# Patient Record
Sex: FEMALE | Race: OTHER | Employment: UNEMPLOYED | URBAN - METROPOLITAN AREA
[De-identification: names, ages, dates, MRNs, and addresses within clinical notes are randomized per-mention and may not be internally consistent; named-entity substitution may affect disease eponyms.]

---

## 2018-03-20 ENCOUNTER — OFFICE VISIT (OUTPATIENT)
Dept: GYNECOLOGY | Age: 63
End: 2018-03-20

## 2018-03-20 VITALS
HEIGHT: 58 IN | HEART RATE: 80 BPM | BODY MASS INDEX: 38.08 KG/M2 | WEIGHT: 181.4 LBS | DIASTOLIC BLOOD PRESSURE: 88 MMHG | SYSTOLIC BLOOD PRESSURE: 140 MMHG

## 2018-03-20 DIAGNOSIS — C54.1 ENDOMETRIAL CANCER (HCC): Primary | ICD-10-CM

## 2018-03-20 DIAGNOSIS — R10.84 GENERALIZED ABDOMINAL PAIN: ICD-10-CM

## 2018-03-20 NOTE — PROGRESS NOTES
Formerly [facility], [facility] GYNECOLOGIC ONCOLOGY  [address] [place] [address], [address], [address] [facility]  [phone]  MD Kj Ramos MD Jeffrie Martin, [address] [facility] [place]  Office Visit    Patient ID:  Name: Cathie Cisneros  MRN: [id number]  : 1955/62 y.o. Visit date: 3/20/2018    Primary Diagnosis:     ICD-10-CM ICD-9-CM    1. Endometrial cancer (HCC) C54.1 182.0 CT ABD PELV W CONT   2. Generalized abdominal pain R10.84 789.07 CT ABD PELV W CONT        Problem List:    Patient Active Problem List   Diagnosis Code    [facility] C54.1       INTERVAL HISTORY: Cathie Cisneros is a  female with a history of stage II endometrial cancer. She underwent TLH, BSO, and staging in 2014. Postoperatively she received pelvic radiation therapy. She was last seen in the office in 2015. She presents today for a problem visit complaining of upper abdominal pain. She denies any vaginal bleeding or discharge. She denies any nausea/vomiting and reports normal bowel movements. Negative  and GI review. Negative cardiopulmonary review. No SOB, palpitations. No syncope  Negative musculoskeletal review. Negative Psychological/Neurological review. Patient denies any abnormal bleeding or vaginal discharge. Weight stable. PMH:  Past Medical History:   Diagnosis Date    GERD (gastroesophageal reflux disease)     Hypertension     PMB (postmenopausal bleeding)     Citizen of [place] speaking patient      PSH:  Past Surgical History:   Procedure Laterality Date    HX CHOLECYSTECTOMY      HX TUBAL LIGATION       SOC:  Social History     Social History    Marital status: SINGLE     Spouse name: N/A    Number of children: N/A    Years of education: N/A     Occupational History    Not on file.      Social History Main Topics    Smoking status: Never Smoker    Smokeless tobacco: Never Used    Alcohol use No    Drug use: No    Sexual activity: Not on file     Other Topics Concern    Not on file     Social History Narrative     Family History  Family History   Problem Relation Age of Onset    Anesth Problems Neg Hx      Medications: (reviewed)  Current Outpatient Prescriptions on File Prior to Visit   Medication Sig Dispense Refill    lisinopril (PRINIVIL, ZESTRIL) 20 mg tablet Take  by mouth daily.  citalopram (CELEXA) 20 mg tablet Take  by mouth daily.  traZODone (DESYREL) 50 mg tablet Take  by mouth nightly.  venlafaxine (EFFEXOR) 75 mg tablet Take 25 mg by mouth three (3) times daily.  oxyCODONE-acetaminophen (PERCOCET) 5-325 mg per tablet Take 1 Tab by mouth every four (4) hours as needed. 40 Tab 0    VITAMIN E ACETATE (VITAMIN E PO) Take  by mouth daily. No current facility-administered medications on file prior to visit. Allergies: (reviewed)  No Known Allergies    ROS:  Negative except that mentioned above    OBJECTIVE:    PHYSICAL EXAM  VITAL SIGNS: Vitals:    03/20/18 1551   BP: 140/88   Pulse: 80   Weight: 181 lb 6.4 oz (82.3 kg)   Height: 4' 9.99\" (1.473 m)     Body mass index is 37.92 kg/(m^2). GENERAL SARAH: Conversant, alert, oriented. NAD   HEENT: Normocephalic. Oropharynx clear. Neck: Trachea midline, no JVD, no thyroid enlargement   RESPIRATORY: Lung fields clear to auscultation and percussion. Adequate respiratory effort   CARDIOVASC: RRR without murmur   GASTROINT: soft, non-tender, without masses or organomegaly. No hernia noted   MUSCULOSKEL: FROM. No focal tenderness. EXTREMITIES: extremities normal, atraumatic, no cyanosis or edema. Pulses appreciated. PELVIC: External genitalia: normal general appearance  Urinary system: urethral meatus normal  Vaginal: normal without tenderness, induration or masses  Cervix: absent  Adnexa: removed surgically  Uterus: absent   RECTAL: Deferred   HUONG SURVEY: Negative throughout---neck, axillae, inguina. NEURO: Grossly intact, no acute deficit. Oriented. Not depressed. Not agitated. IMPRESSION AND PLAN:  Sheela Noyola has a diagnosis of stage II endometrial cancer, treated with surgery and adjuvant radiation therapy. She has a normal pelvic examination today. A pap smear was performed. I am going to send her for a CT of the abdomen/pelvis to rule out any abnormalities and try to determine a cause for her pain.           Timo Wilder MD  3/20/2018/9:14 AM

## 2018-03-20 NOTE — PATIENT INSTRUCTIONS
[employer] Activation    Thank you for requesting access to [employer]. Please follow the instructions below to securely access and download your online medical record. [employer] allows you to send messages to your doctor, view your test results, renew your prescriptions, schedule appointments, and more. How Do I Sign Up? 1. In your internet browser, go to [email or web address]. 2. Click on the First Time User? Click Here link in the Sign In box. You will see the New Member Sign Up page. 3. Enter your [employer] Access Code exactly as it appears below. You will not need to use this code after youve completed the sign-up process. If you do not sign up before the expiration date, you must request a new code. [employer] Access Code: [email or web address]  Expires: 2018  3:31 PM (This is the date your [employer] access code will )    4. Enter the last four digits of your Social Security Number (xxxx) and Date of Birth (mm/dd/yyyy) as indicated and click Submit. You will be taken to the next sign-up page. 5. Create a [employer] ID. This will be your [employer] login ID and cannot be changed, so think of one that is secure and easy to remember. 6. Create a [employer] password. You can change your password at any time. 7. Enter your Password Reset Question and Answer. This can be used at a later time if you forget your password. 8. Enter your e-mail address. You will receive e-mail notification when new information is available in [address]. 9. Click Sign Up. You can now view and download portions of your medical record. 10. Click the Download Summary menu link to download a portable copy of your medical information. Additional Information    If you have questions, please visit the Frequently Asked Questions section of the [employer] website at [email or web address] Remember, [employer] is NOT to be used for urgent needs. For medical emergencies, dial 911.

## 2018-03-20 NOTE — PROGRESS NOTES
Pt c/o pain in the upper and lower abdomen for about 2 months. Pt denies changes in bowels or vaginal bleeding. The patient is taking a blood pressure medication but she is not able to confirm if it is the one listed.

## 2018-03-27 ENCOUNTER — HOSPITAL ENCOUNTER (OUTPATIENT)
Dept: LAB | Age: 63
Discharge: HOME | End: 2018-03-27
Payer: SELF-PAY

## 2018-03-27 DIAGNOSIS — C54.1 ENDOMETRIAL CANCER (HCC): Primary | ICD-10-CM

## 2018-03-27 PROCEDURE — 88142 CYTOPATH C/V THIN LAYER: CPT | Performed by: OBSTETRICS & GYNECOLOGY

## 2018-03-28 ENCOUNTER — HOSPITAL ENCOUNTER (OUTPATIENT)
Dept: CT IMAGING | Age: 63
Discharge: HOME | End: 2018-03-28
Attending: OBSTETRICS & GYNECOLOGY
Payer: SELF-PAY

## 2018-03-28 DIAGNOSIS — C54.1 ENDOMETRIAL CANCER (HCC): ICD-10-CM

## 2018-03-28 DIAGNOSIS — R10.84 GENERALIZED ABDOMINAL PAIN: ICD-10-CM

## 2018-03-28 PROCEDURE — 74177 CT ABD & PELVIS W/CONTRAST: CPT

## 2018-03-28 PROCEDURE — 74011000258 HC RX REV CODE- 258: Performed by: OBSTETRICS & GYNECOLOGY

## 2018-03-28 PROCEDURE — 74011636320 HC RX REV CODE- 636/320: Performed by: OBSTETRICS & GYNECOLOGY

## 2018-03-28 RX ORDER — SODIUM CHLORIDE 0.9 % (FLUSH) 0.9 %
10 SYRINGE (ML) INJECTION
Status: COMPLETED | OUTPATIENT
Start: 2018-03-28 | End: 2018-03-28

## 2018-03-28 RX ADMIN — SODIUM CHLORIDE 100 ML: 900 INJECTION, SOLUTION INTRAVENOUS at 10:06

## 2018-03-28 RX ADMIN — Medication 10 ML: at 10:06

## 2018-03-28 RX ADMIN — IOPAMIDOL 100 ML: 755 INJECTION, SOLUTION INTRAVENOUS at 10:06

## 2018-04-04 ENCOUNTER — TELEPHONE (OUTPATIENT)
Dept: GYNECOLOGY | Age: 63
End: 2018-04-04

## 2018-04-04 NOTE — TELEPHONE ENCOUNTER
Attempted to contact the pt and both numbers listed to advise of pap smear results and need for colposcopy at her appointment tomorrow. I was unable to reach or leave a message to call me.

## 2018-04-04 NOTE — PROGRESS NOTES
Attempted to contact the pt but was unable to reach at both numbers listed or leave a message. Will notify pt at appointment on 4/5/18.

## 2018-04-05 ENCOUNTER — OFFICE VISIT (OUTPATIENT)
Dept: GYNECOLOGY | Age: 63
End: 2018-04-05

## 2018-04-05 VITALS
HEIGHT: 58 IN | BODY MASS INDEX: 38.2 KG/M2 | WEIGHT: 182 LBS | HEART RATE: 66 BPM | DIASTOLIC BLOOD PRESSURE: 67 MMHG | SYSTOLIC BLOOD PRESSURE: 127 MMHG

## 2018-04-05 DIAGNOSIS — R87.611 ATYPICAL SQUAMOUS CELLS CANNOT EXCLUDE HIGH GRADE SQUAMOUS INTRAEPITHELIAL LESION ON CYTOLOGIC SMEAR OF CERVIX (ASC-H): ICD-10-CM

## 2018-04-05 DIAGNOSIS — C54.1 ENDOMETRIAL CANCER (HCC): Primary | ICD-10-CM

## 2018-04-05 NOTE — PROGRESS NOTES
Colposcopy and review ct scan results. Pt notified by translation by her daughter Rey Chacon of pap smear results and colposcopy procedure explained.

## 2018-04-05 NOTE — PROGRESS NOTES
[facility] GYNECOLOGIC ONCOLOGY  [address] [place] [address], [address], [address]  [phone]  MD Cici Esteves MD  [place], [place]  Office Visit    Patient ID:  Name: Catarino Ross  MRN: [id number]  : 1955/62 y.o. Visit date: 2018    Primary Diagnosis:     ICD-10-CM ICD-9-CM    1. Endometrial cancer (HCC) C54.1 182.0    2. Atypical squamous cells cannot exclude high grade squamous intraepithelial lesion on cytologic smear of cervix (ASC-H) R87.611 795.02         Problem List:    Patient Active Problem List   Diagnosis Code    [facility] C54.1    Atypical squamous cells cannot exclude high grade squamous intraepithelial lesion on cytologic smear of cervix (ASC-H) R87.611       INTERVAL HISTORY: Catarino Ross is a  female with a history of stage II endometrial cancer. She underwent TLH, BSO, and staging in 2014. Postoperatively she received pelvic radiation therapy. She was last seen in the office in 2015. She presented recently for a problem visit complaining of upper abdominal pain. She denied any vaginal bleeding or discharge. She denied any nausea/vomiting and reports normal bowel movements. I sent her for a CT of the abdomen/pelvis to evaluate. She was also found to have an abnormal pap smear at that visit, demonstrating ASCUS, cannot exclude HGSIL. Negative  and GI review. Negative cardiopulmonary review. No SOB, palpitations. No syncope  Negative musculoskeletal review. Negative Psychological/Neurological review. Patient denies any abnormal bleeding or vaginal discharge. Weight stable.       PMH:  Past Medical History:   Diagnosis Date    GERD (gastroesophageal reflux disease)     Hypertension     PMB (postmenopausal bleeding)     [place] speaking patient      PSH:  Past Surgical History:   Procedure Laterality Date    HX CHOLECYSTECTOMY      HX TUBAL LIGATION       SOC:  Social History Social History    Marital status: SINGLE     Spouse name: N/A    Number of children: N/A    Years of education: N/A     Occupational History    Not on file. Social History Main Topics    Smoking status: Never Smoker    Smokeless tobacco: Never Used    Alcohol use No    Drug use: No    Sexual activity: Not on file     Other Topics Concern    Not on file     Social History Narrative     Family History  Family History   Problem Relation Age of Onset    Anesth Problems Neg Hx      Medications: (reviewed)  Current Outpatient Prescriptions on File Prior to Visit   Medication Sig Dispense Refill    citalopram (CELEXA) 20 mg tablet Take  by mouth daily.  traZODone (DESYREL) 50 mg tablet Take  by mouth nightly.  lisinopril (PRINIVIL, ZESTRIL) 20 mg tablet Take  by mouth daily.  venlafaxine (EFFEXOR) 75 mg tablet Take 25 mg by mouth three (3) times daily.  VITAMIN E ACETATE (VITAMIN E PO) Take  by mouth daily.  oxyCODONE-acetaminophen (PERCOCET) 5-325 mg per tablet Take 1 Tab by mouth every four (4) hours as needed. 40 Tab 0     No current facility-administered medications on file prior to visit. Allergies: (reviewed)  No Known Allergies    ROS:  Negative except that mentioned above    OBJECTIVE:    PHYSICAL EXAM  VITAL SIGNS: Vitals:    04/05/18 1023   BP: 127/67   Pulse: 66   Weight: 182 lb (82.6 kg)   Height: 4' 9.99\" (1.473 m)     Body mass index is 38.05 kg/(m^2). GENERAL SARAH: Conversant, alert, oriented. NAD   HEENT: Normocephalic. Oropharynx clear. Neck: Trachea midline, no JVD, no thyroid enlargement   RESPIRATORY: Lung fields clear to auscultation and percussion. Adequate respiratory effort   CARDIOVASC: RRR without murmur   GASTROINT: soft, non-tender, without masses or organomegaly. No hernia noted   MUSCULOSKEL: FROM. No focal tenderness. EXTREMITIES: extremities normal, atraumatic, no cyanosis or edema. Pulses appreciated.    PELVIC: External genitalia: normal general appearance  Urinary system: urethral meatus normal  Vaginal: normal without tenderness, induration or masses  Cervix: absent  Adnexa: removed surgically  Uterus: absent  Colposcopy performed with 4% acetic acid. No acetowhite lesions noted. She did have some atrophy though. RECTAL: Deferred   HUONG SURVEY: Negative throughout---neck, axillae, inguina. NEURO: Grossly intact, no acute deficit. Oriented. Not depressed. Not agitated. CT of the abdomen/pelvis (3/28/18)  The visualized lung bases demonstrate no mass or consolidation. The heart size  is normal. There is no pericardial or pleural effusion.     The liver, spleen, pancreas, and adrenal glands are normal. The kidneys are  symmetric without hydronephrosis. The gall bladder is surgically absent without  intra- or extra-hepatic biliary dilatation.       There are no dilated bowel loops. The appendix is normal.  There is sigmoid  diverticulosis without focal adjacent inflammation. There are no enlarged lymph  nodes. There is no free fluid or free air. The aorta tapers without aneurysm.     The urinary bladder is normal.  There is no pelvic mass. The uterus is  surgically absent.     There are degenerative changes in the spine. There is no aggressive bony lesion.     IMPRESSION:   There is no acute abnormality in the abdomen or pelvis. Status post  cholecystectomy and hysterectomy. There is sigmoid diverticulosis without  diverticulitis. IMPRESSION AND PLAN:  Michael Whitt has a diagnosis of stage II endometrial cancer, treated with surgery and adjuvant radiation therapy. She has a normal pelvic examination today. Her colposcopy was negative. Her CT scan showed no appreciable abnormalities other than some diverticulosis. I instructed her to see her PCP about the diverticulosis. She may need a colonoscopy. I will see her back in 6 months.           Alisa Pereira MD  4/5/2018/9:14 AM

## 2021-03-30 ENCOUNTER — TELEPHONE (OUTPATIENT)
Dept: NEUROLOGY | Age: 66
End: 2021-03-30

## 2021-03-30 NOTE — TELEPHONE ENCOUNTER
Left message for pt that we were calling to f-up on a referral dated 1/19/21 from Jyotsna Polanco at [facility]. [facility] and to please call us back if they would like to set up an appt.

## 2022-03-19 PROBLEM — R87.611 ATYPICAL SQUAMOUS CELLS CANNOT EXCLUDE HIGH GRADE SQUAMOUS INTRAEPITHELIAL LESION ON CYTOLOGIC SMEAR OF CERVIX (ASC-H): Status: ACTIVE | Noted: 2018-04-05

## 2022-10-21 ENCOUNTER — TRANSCRIBE ORDER (OUTPATIENT)
Dept: SCHEDULING | Age: 67
End: 2022-10-21

## 2022-10-21 DIAGNOSIS — Z12.31 VISIT FOR SCREENING MAMMOGRAM: Primary | ICD-10-CM

## 2022-10-21 DIAGNOSIS — Z78.0 MENOPAUSE PRESENT: ICD-10-CM

## 2023-04-21 DIAGNOSIS — Z78.0 MENOPAUSE PRESENT: Primary | ICD-10-CM

## 2023-04-21 DIAGNOSIS — Z12.31 VISIT FOR SCREENING MAMMOGRAM: Primary | ICD-10-CM

## 2023-05-18 RX ORDER — VENLAFAXINE 75 MG/1
25 TABLET ORAL 3 TIMES DAILY
COMMUNITY

## 2023-05-18 RX ORDER — TRAZODONE HYDROCHLORIDE 50 MG/1
TABLET ORAL
COMMUNITY

## 2023-05-18 RX ORDER — CITALOPRAM 20 MG/1
TABLET ORAL DAILY
COMMUNITY

## 2023-05-18 RX ORDER — LISINOPRIL 20 MG/1
TABLET ORAL DAILY
COMMUNITY

## 2023-05-18 RX ORDER — OXYCODONE HYDROCHLORIDE AND ACETAMINOPHEN 5; 325 MG/1; MG/1
1 TABLET ORAL EVERY 4 HOURS PRN
COMMUNITY
Start: 2014-07-29